# Patient Record
Sex: MALE | Race: WHITE | NOT HISPANIC OR LATINO | Employment: OTHER | URBAN - METROPOLITAN AREA
[De-identification: names, ages, dates, MRNs, and addresses within clinical notes are randomized per-mention and may not be internally consistent; named-entity substitution may affect disease eponyms.]

---

## 2024-04-15 ENCOUNTER — OFFICE VISIT (OUTPATIENT)
Dept: PODIATRY | Facility: CLINIC | Age: 74
End: 2024-04-15
Payer: MEDICARE

## 2024-04-15 VITALS — HEIGHT: 69 IN | BODY MASS INDEX: 30.21 KG/M2 | WEIGHT: 204 LBS

## 2024-04-15 DIAGNOSIS — M79.671 RIGHT FOOT PAIN: ICD-10-CM

## 2024-04-15 DIAGNOSIS — M72.2 PLANTAR FASCIITIS: Primary | ICD-10-CM

## 2024-04-15 PROCEDURE — 1159F MED LIST DOCD IN RCRD: CPT | Performed by: PODIATRIST

## 2024-04-15 PROCEDURE — 99213 OFFICE O/P EST LOW 20 MIN: CPT | Performed by: PODIATRIST

## 2024-04-15 RX ORDER — AMLODIPINE BESYLATE 10 MG/1
10 TABLET ORAL DAILY
COMMUNITY

## 2024-04-15 RX ORDER — ATORVASTATIN CALCIUM 40 MG/1
40 TABLET, FILM COATED ORAL NIGHTLY
COMMUNITY

## 2024-04-15 RX ORDER — LEVOTHYROXINE SODIUM 150 UG/1
150 TABLET ORAL DAILY
COMMUNITY

## 2024-04-15 RX ORDER — HYDROCHLOROTHIAZIDE 25 MG/1
25 TABLET ORAL
COMMUNITY

## 2024-04-15 RX ORDER — APIXABAN 5 MG/1
5 TABLET, FILM COATED ORAL 2 TIMES DAILY
COMMUNITY

## 2024-04-15 RX ORDER — METOPROLOL SUCCINATE 25 MG/1
25 TABLET, EXTENDED RELEASE ORAL DAILY
COMMUNITY

## 2024-04-15 RX ORDER — OLMESARTAN MEDOXOMIL 40 MG/1
40 TABLET ORAL DAILY
COMMUNITY

## 2024-04-15 RX ORDER — DOXAZOSIN 2 MG/1
2 TABLET ORAL DAILY
COMMUNITY

## 2024-04-15 RX ORDER — POLYETHYLENE GLYCOL 3350 17 G/17G
17 POWDER, FOR SOLUTION ORAL ONCE
COMMUNITY

## 2024-04-15 RX ORDER — CHOLECALCIFEROL (VITAMIN D3) 1250 MCG
50000 TABLET ORAL
COMMUNITY

## 2024-04-15 NOTE — PROGRESS NOTES
CC: right heel pain.     HPI:  Patient presents complaining right  heel pain  x 2 months Pain is mild and rated as 4/10. Pain is present with initial step and after sitting, denies trauma.   Denies any swelling or redness. Took medrol dose pack 2 weeks ago, slight improvement    PCP: Dr. Navas  Last visit: 4-1-24     PMH  No past medical history on file.  MEDS    Current Outpatient Medications:     acetaminophen (Tylenol) 325 mg suppository, Insert 1 suppository (325 mg) into the rectum every 4 hours if needed for mild pain (1 - 3)., Disp: , Rfl:     amLODIPine (Norvasc) 10 mg tablet, Take 1 tablet (10 mg) by mouth once daily., Disp: , Rfl:     atorvastatin (Lipitor) 40 mg tablet, Take 1 tablet (40 mg) by mouth once daily at bedtime., Disp: , Rfl:     cholecalciferol (Vitamin D3) 1,250 mcg (50,000 unit) tablet, Take 1 tablet (50,000 Units) by mouth 1 (one) time per week., Disp: , Rfl:     doxazosin (Cardura) 2 mg tablet, Take 1 tablet (2 mg) by mouth once daily., Disp: , Rfl:     Eliquis 5 mg tablet, Take 1 tablet (5 mg) by mouth 2 times a day., Disp: , Rfl:     fish oil concentrate (Omega-3) 120-180 mg capsule, Take 1 capsule (1 g) by mouth once daily., Disp: , Rfl:     hydroCHLOROthiazide (HYDRODiuril) 25 mg tablet, Take 1 tablet (25 mg) by mouth once daily in the morning. Take before meals., Disp: , Rfl:     levothyroxine (Synthroid, Levoxyl) 150 mcg tablet, Take 1 tablet (150 mcg) by mouth once daily., Disp: , Rfl:     metoprolol succinate XL (Toprol-XL) 25 mg 24 hr tablet, Take 1 tablet (25 mg) by mouth once daily., Disp: , Rfl:     olmesartan (BENIcar) 40 mg tablet, Take 1 tablet (40 mg) by mouth once daily., Disp: , Rfl:     polyethylene glycol (Glycolax, Miralax) 17 gram packet, Take 17 g by mouth 1 time., Disp: , Rfl:   Allergies  No Known Allergies  Social History     Socioeconomic History    Marital status:      Spouse name: Not on file    Number of children: Not on file    Years of education:  "Not on file    Highest education level: Not on file   Occupational History    Not on file   Tobacco Use    Smoking status: Not on file    Smokeless tobacco: Not on file   Substance and Sexual Activity    Alcohol use: Not on file    Drug use: Not on file    Sexual activity: Not on file   Other Topics Concern    Not on file   Social History Narrative    Not on file     Social Determinants of Health     Financial Resource Strain: Not on file   Food Insecurity: Not on file   Transportation Needs: Not on file   Physical Activity: Not on file   Stress: Not on file   Social Connections: Not on file   Intimate Partner Violence: Not on file   Housing Stability: Not on file     No family history on file.  No past surgical history on file.    REVIEW OF SYSTEMS    Musculoskeletal: + as noted in HPI.       Physical examination:   On General Observation: Patient is a pleasant, cooperative, well developed 73 y.o.  adult male. The patient is alert and oriented to time, place and person. Patient has normal affect and mood.  Ht 1.753 m (5' 9\")   Wt 92.5 kg (204 lb)   BMI 30.13 kg/m²     Vascular:  DP and PT pulses are 2/4 b/l.  Mild edema to noted to right heel.      Muscular: Strength is 5/5 for all instrinsic and extrinsic muscle groups with exception of ankle dorsiflexion and plantarflexion d/t guarding.  Pain with palpation to the plantar medial aspect of the right heel at the insertion of the plantar medial calcaneal tubercle.  No signs of calcaneal stress fracture.      Neuro:   Light touch present bilateral.     Derm:   Skin texture and turgor within normal limits.     No rashes, subcutaneous nodules, or open lesions noted.  No hyperkeratotic tissue. No erythema    Ortho:  Pain is present medial tubercle right heel and medial band of the plantar fascia.  Rom is wnl aj, cavus foot is present  ASSESSMENT:    M72.2  Right heel pain       PLAN:   Exam  A comprehensive history and physical examination were preformed. The patient " was educated on clinical findings, diagnosis and treatment plans. Patient understands all that has been explained and all questions were answered to apparent satisfaction.     - We discussed the etiology of the heel complaints as well as the plantar fasciitis treatment protocol.  -Will start price and hep, handout given, rx for xrays.  -Advised patient on use of anti-inflammatory medications .   - Patient given instructions to start PT.    - Consider custom inserts at next visit pending insurance coverage.     - Follow up in 2 weeks      Chris Covarrubias DPM

## 2024-04-29 ENCOUNTER — APPOINTMENT (OUTPATIENT)
Dept: PODIATRY | Facility: CLINIC | Age: 74
End: 2024-04-29
Payer: MEDICARE

## 2024-05-10 ENCOUNTER — OFFICE VISIT (OUTPATIENT)
Dept: PODIATRY | Facility: CLINIC | Age: 74
End: 2024-05-10
Payer: MEDICARE

## 2024-05-10 DIAGNOSIS — M72.2 PLANTAR FASCIITIS: Primary | ICD-10-CM

## 2024-05-10 PROCEDURE — 1159F MED LIST DOCD IN RCRD: CPT | Performed by: PODIATRIST

## 2024-05-10 PROCEDURE — 99212 OFFICE O/P EST SF 10 MIN: CPT | Performed by: PODIATRIST

## 2024-05-10 NOTE — PROGRESS NOTES
CC: right heel pain.      HPI:  Patient presents complaining follow up right heel pain, doing the price and stretching exercises, much improved, had the xrays as well.     PCP: Dr. Navas  Last visit: 4-1-24      PMH  Medical History   No past medical history on file.     MEDS     Current Outpatient Medications:     acetaminophen (Tylenol) 325 mg suppository, Insert 1 suppository (325 mg) into the rectum every 4 hours if needed for mild pain (1 - 3)., Disp: , Rfl:     amLODIPine (Norvasc) 10 mg tablet, Take 1 tablet (10 mg) by mouth once daily., Disp: , Rfl:     atorvastatin (Lipitor) 40 mg tablet, Take 1 tablet (40 mg) by mouth once daily at bedtime., Disp: , Rfl:     cholecalciferol (Vitamin D3) 1,250 mcg (50,000 unit) tablet, Take 1 tablet (50,000 Units) by mouth 1 (one) time per week., Disp: , Rfl:     doxazosin (Cardura) 2 mg tablet, Take 1 tablet (2 mg) by mouth once daily., Disp: , Rfl:     Eliquis 5 mg tablet, Take 1 tablet (5 mg) by mouth 2 times a day., Disp: , Rfl:     fish oil concentrate (Omega-3) 120-180 mg capsule, Take 1 capsule (1 g) by mouth once daily., Disp: , Rfl:     hydroCHLOROthiazide (HYDRODiuril) 25 mg tablet, Take 1 tablet (25 mg) by mouth once daily in the morning. Take before meals., Disp: , Rfl:     levothyroxine (Synthroid, Levoxyl) 150 mcg tablet, Take 1 tablet (150 mcg) by mouth once daily., Disp: , Rfl:     metoprolol succinate XL (Toprol-XL) 25 mg 24 hr tablet, Take 1 tablet (25 mg) by mouth once daily., Disp: , Rfl:     olmesartan (BENIcar) 40 mg tablet, Take 1 tablet (40 mg) by mouth once daily., Disp: , Rfl:     polyethylene glycol (Glycolax, Miralax) 17 gram packet, Take 17 g by mouth 1 time., Disp: , Rfl:   Allergies  No Known Allergies  Social History   Social History            Socioeconomic History    Marital status:        Spouse name: Not on file    Number of children: Not on file    Years of education: Not on file    Highest education level: Not on file  "  Occupational History    Not on file   Tobacco Use    Smoking status: Not on file    Smokeless tobacco: Not on file   Substance and Sexual Activity    Alcohol use: Not on file    Drug use: Not on file    Sexual activity: Not on file   Other Topics Concern    Not on file   Social History Narrative    Not on file      Social Determinants of Health      Financial Resource Strain: Not on file   Food Insecurity: Not on file   Transportation Needs: Not on file   Physical Activity: Not on file   Stress: Not on file   Social Connections: Not on file   Intimate Partner Violence: Not on file   Housing Stability: Not on file         Family History   No family history on file.     Surgical History   No past surgical history on file.        REVIEW OF SYSTEMS     Musculoskeletal: + as noted in HPI.         Physical examination:   On General Observation: Patient is a pleasant, cooperative, well developed 73 y.o.  adult male. The patient is alert and oriented to time, place and person. Patient has normal affect and mood.  Ht 1.753 m (5' 9\")   Wt 92.5 kg (204 lb)   BMI 30.13 kg/m²      Vascular:  DP and PT pulses are 2/4 b/l.  Mild edema to noted to right heel.       Muscular: Strength is 5/5 for all instrinsic and extrinsic muscle groups with exception of ankle dorsiflexion and plantarflexion d/t guarding.  Pain with palpation to the plantar medial aspect of the right heel at the insertion of the plantar medial calcaneal tubercle.  No signs of calcaneal stress fracture.       Neuro:   Light touch present bilateral.      Derm:   Skin texture and turgor within normal limits.     No rashes, subcutaneous nodules, or open lesions noted.  No hyperkeratotic tissue. No erythema     Ortho:  Decreased Pain is present medial tubercle right heel and medial band of the plantar fascia.  Rom is wnl aj, cavus foot is present    ASSESSMENT:    M72.2            PLAN:   Exam  Reviewed xrays from [facility]-no acute issues  Will continue the price and " stretching exercises  Follow up as needed.        Chris Covarrubias DPM

## 2025-09-02 ENCOUNTER — TELEPHONE (OUTPATIENT)
Dept: CARDIOLOGY | Facility: CLINIC | Age: 75
End: 2025-09-02
Payer: MEDICARE

## 2025-09-04 PROBLEM — D68.59 HYPERCOAGULABLE STATE (MULTI): Status: ACTIVE | Noted: 2020-08-23

## 2025-09-04 PROBLEM — G25.2 RESTING TREMOR: Status: ACTIVE | Noted: 2025-09-04

## 2025-09-04 PROBLEM — I50.9 CONGESTIVE HEART FAILURE: Status: ACTIVE | Noted: 2025-09-04

## 2025-09-04 PROBLEM — I47.10 SUPRAVENTRICULAR TACHYCARDIA: Status: ACTIVE | Noted: 2025-09-04

## 2025-09-04 PROBLEM — E03.9 ACQUIRED HYPOTHYROIDISM: Status: ACTIVE | Noted: 2017-01-24

## 2025-09-04 PROBLEM — M15.0 PRIMARY OSTEOARTHRITIS INVOLVING MULTIPLE JOINTS: Status: ACTIVE | Noted: 2021-08-18

## 2025-09-04 PROBLEM — G25.0 ESSENTIAL TREMOR: Status: ACTIVE | Noted: 2025-09-04

## 2025-09-04 PROBLEM — I38 VALVULAR HEART DISEASE: Status: ACTIVE | Noted: 2021-01-06

## 2025-09-04 PROBLEM — R73.01 IFG (IMPAIRED FASTING GLUCOSE): Status: ACTIVE | Noted: 2025-09-04

## 2025-09-04 PROBLEM — I48.0 PAROXYSMAL ATRIAL FIBRILLATION (MULTI): Status: ACTIVE | Noted: 2020-08-11

## 2025-09-04 PROBLEM — K59.01 SLOW TRANSIT CONSTIPATION: Status: ACTIVE | Noted: 2018-01-25

## 2025-09-04 PROBLEM — N40.0 BENIGN PROSTATIC HYPERPLASIA WITHOUT LOWER URINARY TRACT SYMPTOMS: Status: ACTIVE | Noted: 2017-06-08

## 2025-09-04 PROBLEM — I70.1 BILATERAL RENAL ARTERY STENOSIS: Status: ACTIVE | Noted: 2020-12-15

## 2025-09-04 PROBLEM — J30.1 ALLERGIC RHINITIS DUE TO POLLEN: Status: ACTIVE | Noted: 2022-08-31

## 2025-09-04 RX ORDER — METOPROLOL SUCCINATE 100 MG/1
1 TABLET, EXTENDED RELEASE ORAL
COMMUNITY
Start: 2025-08-11

## 2025-09-04 RX ORDER — CARBIDOPA AND LEVODOPA 25; 100 MG/1; MG/1
1.5 TABLET ORAL 3 TIMES DAILY
COMMUNITY
Start: 2025-08-22

## 2025-09-25 ENCOUNTER — APPOINTMENT (OUTPATIENT)
Dept: CARDIOLOGY | Facility: CLINIC | Age: 75
End: 2025-09-25
Payer: MEDICARE